# Patient Record
Sex: MALE | Race: OTHER | URBAN - METROPOLITAN AREA
[De-identification: names, ages, dates, MRNs, and addresses within clinical notes are randomized per-mention and may not be internally consistent; named-entity substitution may affect disease eponyms.]

---

## 2017-09-27 ENCOUNTER — EMERGENCY (EMERGENCY)
Facility: HOSPITAL | Age: 27
LOS: 0 days | Discharge: HOME | End: 2017-09-27
Attending: EMERGENCY MEDICINE
Payer: MEDICAID

## 2017-09-27 VITALS
SYSTOLIC BLOOD PRESSURE: 123 MMHG | RESPIRATION RATE: 18 BRPM | WEIGHT: 240.08 LBS | DIASTOLIC BLOOD PRESSURE: 73 MMHG | HEIGHT: 70 IN | HEART RATE: 71 BPM | OXYGEN SATURATION: 99 % | TEMPERATURE: 98 F

## 2017-09-27 PROCEDURE — 99283 EMERGENCY DEPT VISIT LOW MDM: CPT

## 2017-09-27 RX ORDER — IBUPROFEN 200 MG
800 TABLET ORAL ONCE
Qty: 0 | Refills: 0 | Status: COMPLETED | OUTPATIENT
Start: 2017-09-27 | End: 2017-09-27

## 2017-09-27 RX ORDER — IBUPROFEN 200 MG
1 TABLET ORAL
Qty: 15 | Refills: 0 | OUTPATIENT
Start: 2017-09-27 | End: 2017-10-02

## 2017-09-27 RX ADMIN — Medication 1 TABLET(S): at 10:29

## 2017-09-27 RX ADMIN — Medication 800 MILLIGRAM(S): at 10:46

## 2017-09-27 RX ADMIN — Medication 800 MILLIGRAM(S): at 10:29

## 2017-09-27 NOTE — ED PROVIDER NOTE - CONSTITUTIONAL, MLM
normal... Well appearing, well nourished, awake, alert, oriented to person, place, time/situation and in no apparent distress. Speaking in clear full sentences not holding his head, no drooling

## 2017-09-27 NOTE — ED PROVIDER NOTE - OBJECTIVE STATEMENT
27 years old male walked in c/o left upper tooth pain sensitive since yesterday and unable to see his dentist. Pt denies recent trauma, difficulty of swallowing sob, chest pain, headache, dizziness, nausea, vomiting, abd pain.

## 2017-09-28 DIAGNOSIS — K08.89 OTHER SPECIFIED DISORDERS OF TEETH AND SUPPORTING STRUCTURES: ICD-10-CM

## 2018-10-11 ENCOUNTER — EMERGENCY (EMERGENCY)
Facility: HOSPITAL | Age: 28
LOS: 0 days | Discharge: HOME | End: 2018-10-11
Attending: EMERGENCY MEDICINE
Payer: MEDICAID

## 2018-10-11 VITALS
DIASTOLIC BLOOD PRESSURE: 68 MMHG | SYSTOLIC BLOOD PRESSURE: 120 MMHG | RESPIRATION RATE: 18 BRPM | HEIGHT: 70 IN | HEART RATE: 97 BPM | WEIGHT: 226.19 LBS | TEMPERATURE: 99 F | OXYGEN SATURATION: 97 %

## 2018-10-11 DIAGNOSIS — Z79.2 LONG TERM (CURRENT) USE OF ANTIBIOTICS: ICD-10-CM

## 2018-10-11 DIAGNOSIS — S01.312A LACERATION WITHOUT FOREIGN BODY OF LEFT EAR, INITIAL ENCOUNTER: ICD-10-CM
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
## 2018-10-11 DIAGNOSIS — S63.621A SPRAIN OF INTERPHALANGEAL JOINT OF RIGHT THUMB, INITIAL ENCOUNTER: ICD-10-CM

## 2018-10-11 DIAGNOSIS — S73.102A UNSPECIFIED SPRAIN OF LEFT HIP, INITIAL ENCOUNTER: ICD-10-CM

## 2018-10-11 PROCEDURE — 99283 EMERGENCY DEPT VISIT LOW MDM: CPT

## 2018-10-11 PROCEDURE — 72170 X-RAY EXAM OF PELVIS: CPT | Mod: 26

## 2018-10-11 PROCEDURE — 73130 X-RAY EXAM OF HAND: CPT | Mod: 26,RT

## 2018-10-11 RX ORDER — IBUPROFEN 200 MG
600 TABLET ORAL ONCE
Qty: 0 | Refills: 0 | Status: COMPLETED | OUTPATIENT
Start: 2018-10-11 | End: 2018-10-11

## 2018-10-11 RX ORDER — TETANUS TOXOID, REDUCED DIPHTHERIA TOXOID AND ACELLULAR PERTUSSIS VACCINE, ADSORBED 5; 2.5; 8; 8; 2.5 [IU]/.5ML; [IU]/.5ML; UG/.5ML; UG/.5ML; UG/.5ML
0.5 SUSPENSION INTRAMUSCULAR ONCE
Qty: 0 | Refills: 0 | Status: COMPLETED | OUTPATIENT
Start: 2018-10-11 | End: 2018-10-11

## 2018-10-11 RX ADMIN — Medication 1 TABLET(S): at 13:28

## 2018-10-11 RX ADMIN — TETANUS TOXOID, REDUCED DIPHTHERIA TOXOID AND ACELLULAR PERTUSSIS VACCINE, ADSORBED 0.5 MILLILITER(S): 5; 2.5; 8; 8; 2.5 SUSPENSION INTRAMUSCULAR at 12:40

## 2018-10-11 RX ADMIN — Medication 600 MILLIGRAM(S): at 12:40

## 2018-10-11 RX ADMIN — Medication 600 MILLIGRAM(S): at 13:28

## 2018-10-11 NOTE — ED PROVIDER NOTE - MEDICAL DECISION MAKING DETAILS
complex lac repaired by plastic surgery, analgesics, abx and tetanus immunization given, plastic surgery and clinic follow up recommended

## 2018-10-11 NOTE — ED PROVIDER NOTE - SKIN, MLM
Skin normal color for race, warm, dry, left ear 1 cm cymba, 3 cm mid helix and 1.5 cm posterior ear lacerations

## 2018-10-11 NOTE — ED PROVIDER NOTE - SECONDARY DIAGNOSIS.
Sprain of interphalangeal joint of right thumb, initial encounter Hip strain, left, initial encounter

## 2018-10-11 NOTE — ED PROVIDER NOTE - NSFOLLOWUPCLINICS_GEN_ALL_ED_FT
[facility]  Internal Medicine  [phone] [place] [address]  Phone: [phone]  Fax:   Follow Up Time:

## 2018-10-11 NOTE — ED PROVIDER NOTE - CARE PLAN
Principal Discharge DX:	Laceration of left ear, initial encounter  Secondary Diagnosis:	Sprain of interphalangeal joint of right thumb, initial encounter  Secondary Diagnosis:	Hip strain, left, initial encounter

## 2018-10-11 NOTE — ED PROVIDER NOTE - MUSCULOSKELETAL, MLM
Spine appears normal, range of motion is not limited, right mid thumb mild tenderness, left lateral hip tenderness

## 2018-10-11 NOTE — ED ADULT NURSE NOTE - OBJECTIVE STATEMENT
Left ear injury s/p punch into left ear with a breast knuckle, bleeding and pain reported 7/10, event occurred today

## 2018-10-11 NOTE — CONSULT NOTE ADULT - SUBJECTIVE AND OBJECTIVE BOX
requested  see dictated note  tolerated repair of multiple wounds  Augmentin for 5 days  f/u next wk.

## 2018-10-11 NOTE — ED PROVIDER NOTE - CARE PROVIDER_API CALL
Chelsey Canada (MD), Plastic Surgery  [address] [id number]  Phone: [phone]  Fax: [phone]

## 2021-10-27 ENCOUNTER — EMERGENCY (EMERGENCY)
Facility: HOSPITAL | Age: 31
LOS: 0 days | Discharge: HOME | End: 2021-10-27
Payer: OTHER MISCELLANEOUS

## 2021-10-27 VITALS
WEIGHT: 220.02 LBS | RESPIRATION RATE: 20 BRPM | DIASTOLIC BLOOD PRESSURE: 87 MMHG | SYSTOLIC BLOOD PRESSURE: 125 MMHG | OXYGEN SATURATION: 98 % | HEIGHT: 70 IN | TEMPERATURE: 98 F | HEART RATE: 92 BPM
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
## 2021-10-27 DIAGNOSIS — M54.50 LOW BACK PAIN, UNSPECIFIED: ICD-10-CM

## 2021-10-27 DIAGNOSIS — M54.30 SCIATICA, UNSPECIFIED SIDE: ICD-10-CM
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
## 2021-10-27 PROCEDURE — 99284 EMERGENCY DEPT VISIT MOD MDM: CPT

## 2021-10-27 RX ORDER — METHOCARBAMOL 500 MG/1
1000 TABLET, FILM COATED ORAL ONCE
Refills: 0 | Status: COMPLETED | OUTPATIENT
Start: 2021-10-27 | End: 2021-10-27

## 2021-10-27 RX ORDER — METHOCARBAMOL 500 MG/1
1 TABLET, FILM COATED ORAL
Qty: 14 | Refills: 0
Start: 2021-10-27

## 2021-10-27 RX ORDER — KETOROLAC TROMETHAMINE 30 MG/ML
60 SYRINGE (ML) INJECTION ONCE
Refills: 0 | Status: DISCONTINUED | OUTPATIENT
Start: 2021-10-27 | End: 2021-10-27

## 2021-10-27 RX ADMIN — Medication 60 MILLIGRAM(S): at 16:10

## 2021-10-27 RX ADMIN — METHOCARBAMOL 1000 MILLIGRAM(S): 500 TABLET, FILM COATED ORAL at 15:43

## 2021-10-27 RX ADMIN — Medication 60 MILLIGRAM(S): at 15:43

## 2021-10-27 NOTE — ED PROVIDER NOTE - PHYSICAL EXAMINATION
YUNIEL  C- spine: WNL  T- Spine: WNL  L- Spine: Paraspinal muscles tenderness w/ some discomfort on range of motion however patient able to perform the movement, + SLR to L leg, R leg is WNL. NO other abnormalities noted, no spinous processes tenderness, no ecchymosis, no bruises, no deformities.

## 2021-10-27 NOTE — ED ADULT NURSE NOTE - OBJECTIVE STATEMENT
30 y/o male presents to the ED with back pain s/p heavy lifting at work yesterday, patient denies loss of bowel/bladder, "heard a poppin" sound. took aleve at 5am this morning, states that pain is back

## 2021-10-27 NOTE — ED PROVIDER NOTE - CARE PROVIDER_API CALL
Art Skinner (DO)  Orthopaedic Surgery  [address] [place] [address]  Phone: [phone]  Fax: [phone]  Follow Up Time: 4-6 Days

## 2021-10-27 NOTE — ED PROVIDER NOTE - NSFOLLOWUPINSTRUCTIONS_ED_ALL_ED_FT
Sciatica    WHAT YOU NEED TO KNOW:    What is sciatica? Sciatica is a condition that causes pain along your sciatic nerve. The sciatic nerve runs from your spine through both sides of your buttocks. It then runs down the back of your thigh, into your lower leg and foot. Any place along your sciatic nerve may be compressed, inflamed, irritated, or stretched and cause symptoms.    What causes sciatica? Sciatica may be related to certain activities, poor posture, and physical or psychological stress. Any of the following may cause or increase your risk of sciatica:   •Disc problems: A slipped disc (soft cushion in between the bones of the spine) is the most common cause of sciatica. The disc may press on the sciatic nerve. One bone in your spine may slip over another, or you may have narrowing of the spinal column.   •Muscle injury: This may happen after you twist or lift a heavy object. Swelling from sprained or irritated muscles in the buttocks, thighs, or legs press on the sciatic nerve.  •Obesity or pregnancy: Extra weight increases pressure on your back and legs.  •Trauma: Direct blows on the buttocks, thighs, or legs, car accidents, or falls may injure the sciatic nerve.  .Diseases of the spine: Arthritis, osteoporosis, cancer, or infection of the spine may also affect the sciatic nerve.      What are the signs and symptoms of sciatica? The symptoms of sciatic may be short-term or long-term:  •Pain that goes from the lower back into your buttocks and down the back of your thigh  •Numbness or tingling in your buttocks and legs  •Muscle weakness, difficulty moving or controlling your leg or foot  •Leg pain that increases with standing, sitting, or squatting       How is sciatica diagnosed? Your healthcare provider will ask about other health conditions you may have. He may ask you about your job, history of back pain, diseases, or surgeries you have had. He will examine you and move your legs to see what increases pain. You may also need any of the following:  •X-rays: This is a picture of the bones and tissues in your back, hip, thigh, or leg. This test may show other problems, such as fractures (broken bones).   •CT scan: This test is also called a CAT scan. An x-ray machine uses a computer to take pictures of your hips, thighs, and legs. The pictures may show your sciatic nerve, muscles, and blood vessels. You may be given a dye before the pictures are taken to help healthcare providers see the pictures better. Tell the healthcare provider if you have ever had an allergic reaction to contrast dye.   •MRI: This scan uses powerful magnets and a computer to take pictures of your hips, thighs, and legs. An MRI may show damaged nerves, muscles, bones, and blood vessels. You may be given dye to help the pictures show up better. Tell the healthcare provider if you have ever had an allergic reaction to contrast dye. Do not enter the MRI room with anything metal. Metal can cause serious injury. Tell the healthcare provider if you have any metal in or on your body.   •An electromyography (EMG) test measures the electrical activity of your muscles at rest and with movement.  •Nerve conduction tests: These tests check how surface nerves and related muscles respond to stimulation. Electrodes with wires or tiny needles are placed on certain areas, such as the buttocks and legs.      How is sciatica treated?   •NSAIDs: These medicines decrease swelling and pain. NSAIDs are available without a doctor's order. Ask your healthcare provider which medicine is right for you. Ask how much to take and when to take it. Take as directed. NSAIDs can cause stomach bleeding or kidney problems if not taken correctly.  •Acetaminophen: This medicine decreases pain. Acetaminophen is available without a doctor's order. Ask how much to take and how often to take it. Follow directions. Acetaminophen can cause liver damage if not taken correctly.  •Muscle relaxers help decrease pain and muscle spasms.  •Epidural steroid medicine: This may include both an anesthetic (numbing medicine) and a steroid, which may decrease swelling and relieve pain. It is given as a shot close to the spine in the area where you have pain.  •Chemonucleolysis: This is an injection given into the damaged disc to soften or shrink the disc.  •Surgery: This may be done to correct problems such as a damaged disc, or a tumor in your spine. It may be done to decrease the pressure on the sciatic nerve. Healthcare providers may also release the muscle that may be pressing into your sciatic nerve.      How can I help manage sciatica?   •Ultrasound therapy: This is a machine that uses sound waves to decrease pain. Topical medicines may be added to help decrease pain and inflammation.  •Physical therapy: A physical therapist teaches you exercises to help improve movement and strength, and to decrease pain. An occupational therapist teaches you skills to help with your daily activities.   •Assistive devices: You may need to wear back support, such as a back brace. You may need crutches, a cane, or a walker to decrease stress on your lower back and leg muscles. Ask your healthcare provider for more information about assistive devices and how to use them correctly.      How can sciatica be prevented?   •Avoid pressure on your back and legs: Do not lift heavy objects, or stand or sit for long periods of time.  •Lift objects safely: Keep your back straight and bend your knees when you  an object. Do not bend or twist your back when you lift.  •Maintain a healthy weight: Ask your healthcare provider how much you should weigh. Ask him to help you create a weight loss plan if you are overweight.   •Exercise: Ask your healthcare provider about the best stretching, warmup, and exercise plan for you.       What are the risks of sciatica? An epidural steroid injection can lead to pain disorders or paralysis if it is placed incorrectly. It may also cause headaches, leg pain, and blockage of blood flow to the spinal cord. Surgery may cause you to bleed or get an infection. If not treated, your muscles and nerves may become damaged permanently. You may have decreased strength. You may not be able to move your leg or control when you urinate or have bowel movements.     When should I contact my healthcare provider?   •You have pain in your lower back at night or when resting.  •You have pain in your lower back with numbness below the knee.  .You have weakness in one leg only.  •You have questions or concerns about your condition or care.    When should I seek immediate care or call 911?   •You have trouble holding back your urine or bowel movements.  •You have weakness in both legs.  •You have numbness in your groin or buttocks.

## 2021-10-27 NOTE — ED ADULT NURSE NOTE - PAIN: BODY LOCATION
Multi-Ox Readings  Multi Ox #1 Room air   O2 sat % at rest 84   O2 sat % on exertion     O2 sat average on exertion     Multi Ox #2 2 LPM   O2 sat % at rest 96   O2 sat % on exertion 90   O2 sat average on exertion 92     Oxygen Use 1.5(2LPM at home)   Oxygen Frequency 24/7   Duration of need     Is the patient mobile within the home?     CPAP Use?     BIPAP Use?     Servo Titration            back

## 2021-10-27 NOTE — ED PROVIDER NOTE - PROGRESS NOTE DETAILS
Pt reports that he feels much better than before after the medication. Pt requesting cane to go home. Will send patient home on similar meds given here. F/U recommended with PCP. Return precautions explained. All questions and concerns addressed and pt is stable for discharge.

## 2021-10-27 NOTE — ED PROVIDER NOTE - PATIENT PORTAL LINK FT
You can access the FollowMyHealth Patient Portal offered by [facility] by registering at the following website: [email or web address]. By joining [facility]’s FollowMyHealth portal, you will also be able to view your health information using other applications (apps) compatible with our system.

## 2021-10-27 NOTE — ED PROVIDER NOTE - CLINICAL SUMMARY MEDICAL DECISION MAKING FREE TEXT BOX
Back pain most likely 2/2 musculoskeletal/sciatica  - Reassurance  - Pt education  - Warm compresses  - Toradol 60 mg IM  - Robaxin 1 gram PO  - Observe  - Reassess

## 2021-10-27 NOTE — ED PROVIDER NOTE - OBJECTIVE STATEMENT
30 y/o male presents w/ lower back pain w/ radiation to L leg x yesterday which he rates as 8/10 at it's worst. Reports that he has a h/o un-diagnosed sciatica pain. Reports that he was at work yesterday and lifted a heavy box which caused the pain. Reports taking aleve w/ no alleviation in sx. Laying flat is palliative movement is provocative.     Denies weakness, numbness, tingling, saddle anesthesia, incontinence of bowel/urine, fevers, chills, or any other constitutional sx.    Allergies: NKDA 30 y/o male presents w/ lower back pain w/ radiation to L leg x yesterday which he rates as 8/10 at it's worst. Reports that he has a h/o un-diagnosed sciatica pain. Reports that he was at work yesterday morning and lifted a heavy box which caused the pain. Reports taking aleve w/ no alleviation in sx. Laying flat is palliative movement is provocative.     Denies weakness, numbness, tingling, saddle anesthesia, incontinence of bowel/urine, fevers, chills, or any other constitutional sx.    Allergies: NKDA 32 y/o male presents w/ lower back pain w/ radiation to L leg x yesterday which he rates as 8/10 at it's worst. Reports that he has a h/o un-diagnosed sciatica pain. Reports that he was at work yesterday morning and lifted a heavy box which caused the pain. Reports taking aleve w/ no alleviation in sx. Laying flat is palliative movement is provocative.     Denies weakness, numbness, tingling, saddle anesthesia, incontinence of bowel/urine, fevers, chills, trauma/falls or any other constitutional sx.    Allergies: NKDA
